# Patient Record
(demographics unavailable — no encounter records)

---

## 2025-06-19 NOTE — HISTORY OF PRESENT ILLNESS
[de-identified] : 06/19/2025: The patient is a 37 year old M, [right/left] hand dominant who presents today complaining of left bicep tendon tear. Date of Injury/Onset: Occurred Tuesday morning 6/17/2025 Pain:    At Rest: 0/10 With Activity:  0/10 Mechanism of injury: Patient reports he felt a pop in his bicep moving something in his truck.  This is [not] a Work Related Injury being treated under Worker's Compensation. This is [not] an athletic injury occurring associated with an interscholastic or organized sports team. Quality of symptoms: Tender, sore Improves with: Nothing improves the pain Worse with: Movement Prior treatment: Dr. Taylor 6/17/20225 - XR, MRI Prior imaging: XR [facility] 6/17/2025, MRI [facility] 6/18/2025 Previous injury: [None] School/Sport/Position/Occupation: Owns a chip route Additional Information: Pt reports pulling a wheel along a wheel track, as he went to push wheel through the track, wheel did not move, arm continued & reports a " pop"

## 2025-06-19 NOTE — DATA REVIEWED
[MRI] : MRI [Left] : left [Elbow] : elbow [Report was reviewed and noted in the chart] : The report was reviewed and noted in the chart [I independently reviewed and interpreted images and report] : I independently reviewed and interpreted images and report [I reviewed the films/CD] : I reviewed the films/CD [FreeTextEntry1] : [facility] Impression: Full thickness rupture of the distal biceps tendon with retraction and a large amount of surrounding soft tissue edema.

## 2025-06-19 NOTE — HISTORY OF PRESENT ILLNESS
[de-identified] : 06/19/2025: The patient is a 37 year old M, [right/left] hand dominant who presents today complaining of left bicep tendon tear. Date of Injury/Onset: Occurred Tuesday morning 6/17/2025 Pain:    At Rest: 0/10 With Activity:  0/10 Mechanism of injury: Patient reports he felt a pop in his bicep moving something in his truck.  This is [not] a Work Related Injury being treated under Worker's Compensation. This is [not] an athletic injury occurring associated with an interscholastic or organized sports team. Quality of symptoms: Tender, sore Improves with: Nothing improves the pain Worse with: Movement Prior treatment: Dr. Taylor 6/17/20225 - XR, MRI Prior imaging: XR [facility] 6/17/2025, MRI [facility] 6/18/2025 Previous injury: [None] School/Sport/Position/Occupation: Owns a chip route Additional Information: Pt reports pulling a wheel along a wheel track, as he went to push wheel through the track, wheel did not move, arm continued & reports a " pop"

## 2025-06-19 NOTE — DISCUSSION/SUMMARY
[de-identified] : Plan: Patient understands that He is a candidate for Distal Biceps Repair surgery. Discussed the risks and benefits of the procedure. Discussed non-operative and operative treatment options. All questions and concerns regarding the surgery were addressed. Went over the recovery timeline and expected outcomes following surgery.  Patient is undecided at this time, and would like to consider nonop magaement.   The patient's current medication management of their orthopedic diagnosis was reviewed today: (1) We discussed a comprehensive treatment plan that included possible pharmaceutical management involving the use of prescription strength medications including but not limited to options such as oral Naprosyn 500mg BID, once daily Meloxicam 15 mg, or 500-650 mg Tylenol versus over the counter oral medications and topical prescription NSAID Pennsaid vs over the counter Voltaren gel.   (2) There is a moderate risk of morbidity with further treatment, especially from use of prescription strength medications and possible side effects of these medications which include upset stomach with oral medications, skin reactions to topical medications and cardiac/renal issues with long term use.   (3) I recommended that the patient follow-up with their medical physician to discuss any significant specific potential issues with long term medication use such as interactions with current medications or with exacerbation of underlying medical comorbidities.   (4) The benefits and risks associated with use of injectable, oral or topical, prescription and over the counter anti-inflammatory medications were discussed with the patient. The patient voiced understanding of the risks including but not limited to bleeding, stroke, kidney dysfunction, heart disease, and were referred to the black box warning label for further information.  Due to OR availability, I referred pt to Dr. Abelino De León for operative treatment.

## 2025-06-19 NOTE — DISCUSSION/SUMMARY
[de-identified] : Plan: Patient understands that He is a candidate for Distal Biceps Repair surgery. Discussed the risks and benefits of the procedure. Discussed non-operative and operative treatment options. All questions and concerns regarding the surgery were addressed. Went over the recovery timeline and expected outcomes following surgery.  Patient is undecided at this time, and would like to consider nonop magaement.   The patient's current medication management of their orthopedic diagnosis was reviewed today: (1) We discussed a comprehensive treatment plan that included possible pharmaceutical management involving the use of prescription strength medications including but not limited to options such as oral Naprosyn 500mg BID, once daily Meloxicam 15 mg, or 500-650 mg Tylenol versus over the counter oral medications and topical prescription NSAID Pennsaid vs over the counter Voltaren gel.   (2) There is a moderate risk of morbidity with further treatment, especially from use of prescription strength medications and possible side effects of these medications which include upset stomach with oral medications, skin reactions to topical medications and cardiac/renal issues with long term use.   (3) I recommended that the patient follow-up with their medical physician to discuss any significant specific potential issues with long term medication use such as interactions with current medications or with exacerbation of underlying medical comorbidities.   (4) The benefits and risks associated with use of injectable, oral or topical, prescription and over the counter anti-inflammatory medications were discussed with the patient. The patient voiced understanding of the risks including but not limited to bleeding, stroke, kidney dysfunction, heart disease, and were referred to the black box warning label for further information.  Due to OR availability, I referred pt to Dr. Abelino De León for operative treatment.

## 2025-06-19 NOTE — IMAGING
[de-identified] : LEFT ELBOW Inspection: Swelling Palpation: Tenderness anterior and unable to palpate distal biceps Range of Motion: Full elbow flexion and elbow extension.  Strength: flexion strength 3/5, extension strength 5/5 Neurological: motor exam 5/5 and light touch intact.  Ligament Stability and Special Testing: No varus or valgus instability

## 2025-06-19 NOTE — IMAGING
[de-identified] : LEFT ELBOW Inspection: Swelling Palpation: Tenderness anterior and unable to palpate distal biceps Range of Motion: Full elbow flexion and elbow extension.  Strength: flexion strength 3/5, extension strength 5/5 Neurological: motor exam 5/5 and light touch intact.  Ligament Stability and Special Testing: No varus or valgus instability